# Patient Record
Sex: FEMALE | Race: BLACK OR AFRICAN AMERICAN | NOT HISPANIC OR LATINO | Employment: UNEMPLOYED | URBAN - METROPOLITAN AREA
[De-identification: names, ages, dates, MRNs, and addresses within clinical notes are randomized per-mention and may not be internally consistent; named-entity substitution may affect disease eponyms.]

---

## 2021-12-09 ENCOUNTER — TELEPHONE (OUTPATIENT)
Dept: OBSTETRICS AND GYNECOLOGY | Facility: CLINIC | Age: 26
End: 2021-12-09
Payer: MEDICAID

## 2021-12-16 ENCOUNTER — TELEPHONE (OUTPATIENT)
Dept: OBSTETRICS AND GYNECOLOGY | Facility: CLINIC | Age: 26
End: 2021-12-16
Payer: MEDICAID

## 2021-12-20 ENCOUNTER — TELEPHONE (OUTPATIENT)
Dept: OBSTETRICS AND GYNECOLOGY | Facility: HOSPITAL | Age: 26
End: 2021-12-20
Payer: MEDICAID

## 2021-12-22 ENCOUNTER — TELEPHONE (OUTPATIENT)
Dept: OBSTETRICS AND GYNECOLOGY | Facility: CLINIC | Age: 26
End: 2021-12-22
Payer: MEDICAID

## 2022-01-07 ENCOUNTER — TELEPHONE (OUTPATIENT)
Dept: OBSTETRICS AND GYNECOLOGY | Facility: CLINIC | Age: 27
End: 2022-01-07
Payer: MEDICAID

## 2022-01-07 ENCOUNTER — OFFICE VISIT (OUTPATIENT)
Dept: OBSTETRICS AND GYNECOLOGY | Facility: CLINIC | Age: 27
End: 2022-01-07
Payer: MEDICAID

## 2022-01-07 VITALS
BODY MASS INDEX: 31.4 KG/M2 | SYSTOLIC BLOOD PRESSURE: 112 MMHG | DIASTOLIC BLOOD PRESSURE: 60 MMHG | WEIGHT: 188.5 LBS | HEIGHT: 65 IN

## 2022-01-07 DIAGNOSIS — O34.219 PREVIOUS CESAREAN SECTION COMPLICATING PREGNANCY: ICD-10-CM

## 2022-01-07 DIAGNOSIS — Z87.59 HISTORY OF PLACENTA ABRUPTION: ICD-10-CM

## 2022-01-07 DIAGNOSIS — Z36.89 ENCOUNTER FOR ULTRASOUND TO ASSESS FETAL GROWTH: Primary | ICD-10-CM

## 2022-01-07 PROCEDURE — 99203 PR OFFICE/OUTPT VISIT, NEW, LEVL III, 30-44 MIN: ICD-10-PCS | Mod: TH,S$PBB,, | Performed by: ADVANCED PRACTICE MIDWIFE

## 2022-01-07 PROCEDURE — 99999 PR PBB SHADOW E&M-EST. PATIENT-LVL III: ICD-10-PCS | Mod: PBBFAC,,, | Performed by: ADVANCED PRACTICE MIDWIFE

## 2022-01-07 PROCEDURE — 99213 OFFICE O/P EST LOW 20 MIN: CPT | Mod: PBBFAC,TH | Performed by: ADVANCED PRACTICE MIDWIFE

## 2022-01-07 PROCEDURE — 3078F PR MOST RECENT DIASTOLIC BLOOD PRESSURE < 80 MM HG: ICD-10-PCS | Mod: CPTII,,, | Performed by: ADVANCED PRACTICE MIDWIFE

## 2022-01-07 PROCEDURE — 1159F PR MEDICATION LIST DOCUMENTED IN MEDICAL RECORD: ICD-10-PCS | Mod: CPTII,,, | Performed by: ADVANCED PRACTICE MIDWIFE

## 2022-01-07 PROCEDURE — 3074F SYST BP LT 130 MM HG: CPT | Mod: CPTII,,, | Performed by: ADVANCED PRACTICE MIDWIFE

## 2022-01-07 PROCEDURE — 99203 OFFICE O/P NEW LOW 30 MIN: CPT | Mod: TH,S$PBB,, | Performed by: ADVANCED PRACTICE MIDWIFE

## 2022-01-07 PROCEDURE — 99999 PR PBB SHADOW E&M-EST. PATIENT-LVL III: CPT | Mod: PBBFAC,,, | Performed by: ADVANCED PRACTICE MIDWIFE

## 2022-01-07 PROCEDURE — 1159F MED LIST DOCD IN RCRD: CPT | Mod: CPTII,,, | Performed by: ADVANCED PRACTICE MIDWIFE

## 2022-01-07 PROCEDURE — 3008F BODY MASS INDEX DOCD: CPT | Mod: CPTII,,, | Performed by: ADVANCED PRACTICE MIDWIFE

## 2022-01-07 PROCEDURE — 3078F DIAST BP <80 MM HG: CPT | Mod: CPTII,,, | Performed by: ADVANCED PRACTICE MIDWIFE

## 2022-01-07 PROCEDURE — 3008F PR BODY MASS INDEX (BMI) DOCUMENTED: ICD-10-PCS | Mod: CPTII,,, | Performed by: ADVANCED PRACTICE MIDWIFE

## 2022-01-07 PROCEDURE — 3074F PR MOST RECENT SYSTOLIC BLOOD PRESSURE < 130 MM HG: ICD-10-PCS | Mod: CPTII,,, | Performed by: ADVANCED PRACTICE MIDWIFE

## 2022-01-07 RX ORDER — ASPIRIN 81 MG/1
81 TABLET ORAL DAILY
COMMUNITY

## 2022-01-07 RX ORDER — FERROUS SULFATE TAB 325 MG (65 MG ELEMENTAL FE) 325 (65 FE) MG
TAB ORAL DAILY
COMMUNITY
Start: 2021-12-11

## 2022-01-07 NOTE — PROGRESS NOTES
CHIEF COMPLAINT:   Patient presents with      Possible Pregnancy        HISTORY OF PRESENT ILLNESS  Rocky Sales 26 y.o.  presents for pregnancy risk assessment.   The patient has no complaints today.  No nausea or vomiting. No bleeding or pain.  [facility] She has had a successful vaginal delivery followed by 2 c/s (first for abruption, second was repeat).   She is 35 weeks today.       Past Medical History:   Diagnosis Date    Anemia        Past Surgical History:   Procedure Laterality Date     SECTION      X2       Family History   Problem Relation Age of Onset    Diabetes Father     Breast cancer Neg Hx     Colon cancer Neg Hx     Ovarian cancer Neg Hx     Cancer Neg Hx     Eclampsia Neg Hx     Hypertension Neg Hx     Miscarriages / Stillbirths Neg Hx      labor Neg Hx     Stroke Neg Hx        Social History     Socioeconomic History    Marital status:    Tobacco Use    Smoking status: Never Smoker    Smokeless tobacco: Never Used   Substance and Sexual Activity    Alcohol use: Not Currently    Drug use: Never    Sexual activity: Yes     Partners: Male       Current Outpatient Medications   Medication Sig Dispense Refill    aspirin (ECOTRIN) 81 MG EC tablet Take 81 mg by mouth once daily.      FEROSUL 325 mg (65 mg iron) Tab tablet Take by mouth once daily.      prenatal vit no.124/iron/folic (PRENATAL VITAMIN ORAL) Take by mouth.       No current facility-administered medications for this visit.       Review of patient's allergies indicates:  No Known Allergies      PHYSICAL EXAM   Vitals:    22 1326   BP: 112/60        PAIN SCALE: 0/10 None    PHYSICAL EXAM    ROS:  GENERAL: No fever, chills, fatigability or weight loss.  CV: Denies chest pain  PULM: Denies shortness of breath or wheezing.  ABDOMEN: Appetite fine. No weight loss. Denies diarrhea, abdominal pain, hematemesis or blood in  stool.  URINARY: No flank pain, dysuria or hematuria.  REPRODUCTIVE: No abnormal vaginal bleeding.       PE:   APPEARANCE: Well nourished, well developed, in no acute distress  CHEST: Clear to auscultation bilaterally  CV: Regular rate and rhythm      A/P:     -Discussed  in depth. Will need to see MD for approval. Discussed NO IOL with , and if unfavorable will repeat c/s at 41 weeks.   -Breech on US that was done with MD yesterday. Discussed NO breech vaginal delivery. Will do US nv for growth and position.   Discussed GBS collection nv.   LARS signed for records/op report.

## 2022-01-07 NOTE — TELEPHONE ENCOUNTER
----- Message from Cathy Lezama sent at 1/7/2022  8:16 AM CST -----  Regarding: Appt Scheduled  Patient has an appt scheduled for this afternoon   Patient would like to know if she can be seen at an earlier time today   Please Assist     Patient can be reached at [phone]

## 2022-01-19 ENCOUNTER — PATIENT MESSAGE (OUTPATIENT)
Dept: OBSTETRICS AND GYNECOLOGY | Facility: CLINIC | Age: 27
End: 2022-01-19
Payer: MEDICAID

## 2022-01-20 ENCOUNTER — PROCEDURE VISIT (OUTPATIENT)
Dept: OBSTETRICS AND GYNECOLOGY | Facility: CLINIC | Age: 27
End: 2022-01-20
Payer: MEDICAID

## 2022-01-20 ENCOUNTER — TELEPHONE (OUTPATIENT)
Dept: OBSTETRICS AND GYNECOLOGY | Facility: CLINIC | Age: 27
End: 2022-01-20
Payer: MEDICAID

## 2022-01-20 ENCOUNTER — PATIENT MESSAGE (OUTPATIENT)
Dept: OBSTETRICS AND GYNECOLOGY | Facility: CLINIC | Age: 27
End: 2022-01-20

## 2022-01-20 ENCOUNTER — ROUTINE PRENATAL (OUTPATIENT)
Dept: OBSTETRICS AND GYNECOLOGY | Facility: CLINIC | Age: 27
End: 2022-01-20
Payer: MEDICAID

## 2022-01-20 VITALS
SYSTOLIC BLOOD PRESSURE: 120 MMHG | BODY MASS INDEX: 31.55 KG/M2 | WEIGHT: 189.63 LBS | DIASTOLIC BLOOD PRESSURE: 78 MMHG

## 2022-01-20 DIAGNOSIS — O34.219 PREVIOUS CESAREAN SECTION COMPLICATING PREGNANCY: Primary | ICD-10-CM

## 2022-01-20 DIAGNOSIS — Z36.89 ENCOUNTER FOR ULTRASOUND TO ASSESS FETAL GROWTH: ICD-10-CM

## 2022-01-20 PROCEDURE — 76816 US OB/GYN PROCEDURE (VIEWPOINT): ICD-10-PCS | Mod: 26,S$PBB,, | Performed by: OBSTETRICS & GYNECOLOGY

## 2022-01-20 PROCEDURE — 99212 OFFICE O/P EST SF 10 MIN: CPT | Mod: PBBFAC,TH | Performed by: OBSTETRICS & GYNECOLOGY

## 2022-01-20 PROCEDURE — 99212 PR OFFICE/OUTPT VISIT, EST, LEVL II, 10-19 MIN: ICD-10-PCS | Mod: TH,S$PBB,, | Performed by: OBSTETRICS & GYNECOLOGY

## 2022-01-20 PROCEDURE — 99212 OFFICE O/P EST SF 10 MIN: CPT | Mod: TH,S$PBB,, | Performed by: OBSTETRICS & GYNECOLOGY

## 2022-01-20 PROCEDURE — 99999 PR PBB SHADOW E&M-EST. PATIENT-LVL II: CPT | Mod: PBBFAC,,, | Performed by: OBSTETRICS & GYNECOLOGY

## 2022-01-20 PROCEDURE — 99999 PR PBB SHADOW E&M-EST. PATIENT-LVL II: ICD-10-PCS | Mod: PBBFAC,,, | Performed by: OBSTETRICS & GYNECOLOGY

## 2022-01-20 PROCEDURE — 76819 US OB/GYN PROCEDURE (VIEWPOINT): ICD-10-PCS | Mod: 26,S$PBB,, | Performed by: OBSTETRICS & GYNECOLOGY

## 2022-01-20 PROCEDURE — 76816 OB US FOLLOW-UP PER FETUS: CPT | Mod: PBBFAC | Performed by: OBSTETRICS & GYNECOLOGY

## 2022-01-20 PROCEDURE — 76819 FETAL BIOPHYS PROFIL W/O NST: CPT | Mod: 26,S$PBB,, | Performed by: OBSTETRICS & GYNECOLOGY

## 2022-01-20 NOTE — TELEPHONE ENCOUNTER
----- Message from Cece Iniguez sent at 1/20/2022  2:04 PM CST -----  Regarding: pt call  Name of Who is Calling: LUTHER MARTÍNEZ [[id number]]           What is the request in detail: Would like a call back from midwife, states she has opp and pregnancy report and previous surgery. She would like to know if she can upload pictures to [facility]. pl advise.            Can the clinic reply by [facility]: yes           What Number to Call Back if not in [facility]: [phone]

## 2022-01-20 NOTE — TELEPHONE ENCOUNTER
----- Message from Cece Iniguez sent at 1/20/2022  2:04 PM CST -----  Regarding: pt call  Name of Who is Calling: LUTHER MARTÍNEZ [[id number]]           What is the request in detail: Would like a call back from midwife, states she has opp and pregnancy report and previous surgery. She would like to know if she can upload pictures to [facility]. pl advise.            Can the clinic reply by [facility]: yes           What Number to Call Back if not in [facility]: [phone]

## 2022-01-20 NOTE — PROGRESS NOTES
US today: 2872 g (24%), cephalic, 3VC, anterior placenta, BPP 8/8, NASRIN 12.6, MVP 6.3, Debris noted in stomach  Pt reports no complaints.  Was referred by CNM to discuss delivery options.  Pt with prenatal care with Dr. Acharya in [place].  Pt with history of  x 1 followed by C/S x 2.  Pt desires TOLAC but states that Dr. Morales will not do this.  Pt would like to discuss options with [facility].  To date, states pregnancy has been unremarkable and already had GBS done in [place].  No records in chart.    A/P:  Previous  section complicating pregnancy  -    Pt was counseled on delivery options ( vs Repeat C/S) along with associated risks and benefits.  In particular, risk of uterine rupture with  attempt was reviewed.  Also discussed limited data with TOLAC with C/S x 2, but overall safe to proceed if able to confirm LTCS with both surgeries.  Group policy against elective induction or use of pitocin/cytotec in this scenario was discussed.  Pt voiced understanding and desires to proceed with TOLAC/.  There are no records for me to review today, which limits my ability to approve this.  Thus, pt was advised that I cannot approve TOLAC without being able to review both Op Reports first.  If able to confirm LTCS with both surgeries, then would be able to proceed with TOLAC approval.  -     Request Op reports for both C/S and request all prenatal records from Dr. Acharya's office ASAP.  Pt also advised to contact his office to obtain a physical copy of her prenatal records and Op report to bring to her next visit.  -     Labor precautions and kick counts.    Follow up in about 1 week (around 2022).

## 2022-01-20 NOTE — TELEPHONE ENCOUNTER
Called patient and she stated she just spoke to someone and she is going to upload records into My Chart.

## 2022-01-21 ENCOUNTER — PATIENT MESSAGE (OUTPATIENT)
Dept: OBSTETRICS AND GYNECOLOGY | Facility: CLINIC | Age: 27
End: 2022-01-21
Payer: MEDICAID

## 2022-01-25 ENCOUNTER — TELEPHONE (OUTPATIENT)
Dept: OBSTETRICS AND GYNECOLOGY | Facility: CLINIC | Age: 27
End: 2022-01-25
Payer: MEDICAID

## 2022-01-25 NOTE — TELEPHONE ENCOUNTER
----- Message from Patricia Rowland sent at 1/25/2022 12:49 PM CST -----  Regarding: Need Medical Advice  Type:  Needs Medical Advice    Who Called: Patient states experiencing stomach and back  pain and nausea.   Patient has questions  need to be advised    Symptoms (please be specific):   How long has patient had these symptoms:      Would the patient rather a call back or a response via [facility]? call  Best Call Back Number: [phone]  Additional Information:

## 2022-01-25 NOTE — TELEPHONE ENCOUNTER
Called patient to discuss pain and nausea that she has been having on and off for the past 24 hours. Patient states that it has stopped but she's afraid that she will begin to experience symptoms again and go into labor. Advised patient that if contractions are 3-5 minutes apart lasting 1min then she should go to Labor and Delivery. Patient verbalized understanding.

## 2022-01-31 ENCOUNTER — NURSE TRIAGE (OUTPATIENT)
Dept: ADMINISTRATIVE | Facility: CLINIC | Age: 27
End: 2022-01-31
Payer: MEDICAID

## 2022-02-01 ENCOUNTER — HOSPITAL ENCOUNTER (OUTPATIENT)
Facility: HOSPITAL | Age: 27
Discharge: HOME | End: 2022-02-01
Attending: OBSTETRICS & GYNECOLOGY | Admitting: OBSTETRICS & GYNECOLOGY
Payer: MEDICAID

## 2022-02-01 VITALS
OXYGEN SATURATION: 98 % | DIASTOLIC BLOOD PRESSURE: 67 MMHG | HEART RATE: 108 BPM | RESPIRATION RATE: 18 BRPM | SYSTOLIC BLOOD PRESSURE: 112 MMHG | TEMPERATURE: 99 F

## 2022-02-01 DIAGNOSIS — O47.9 THREATENED LABOR AT TERM: ICD-10-CM

## 2022-02-01 LAB
BILIRUB UR QL STRIP: NEGATIVE
CLARITY UR: CLEAR
COLOR UR: YELLOW
GLUCOSE UR QL STRIP: NEGATIVE
HGB UR QL STRIP: NEGATIVE
KETONES UR QL STRIP: NEGATIVE
LEUKOCYTE ESTERASE UR QL STRIP: ABNORMAL
MICROSCOPIC COMMENT: NORMAL
NITRITE UR QL STRIP: NEGATIVE
PH UR STRIP: 6 [PH] (ref 5–8)
PROT UR QL STRIP: NEGATIVE
SP GR UR STRIP: 1.01 (ref 1–1.03)
URN SPEC COLLECT METH UR: ABNORMAL
UROBILINOGEN UR STRIP-ACNC: NEGATIVE EU/DL
WBC #/AREA URNS HPF: 5 /HPF (ref 0–5)

## 2022-02-01 PROCEDURE — 59025 FETAL NON-STRESS TEST: CPT | Mod: 26,,, | Performed by: MIDWIFE

## 2022-02-01 PROCEDURE — 59025 OBTAIN FETAL NONSTRESS TEST (NST): ICD-10-PCS | Mod: 26,,, | Performed by: MIDWIFE

## 2022-02-01 PROCEDURE — 99213 OFFICE O/P EST LOW 20 MIN: CPT | Mod: TH,25,, | Performed by: MIDWIFE

## 2022-02-01 PROCEDURE — 81000 URINALYSIS NONAUTO W/SCOPE: CPT | Performed by: MIDWIFE

## 2022-02-01 PROCEDURE — 99211 OFF/OP EST MAY X REQ PHY/QHP: CPT | Mod: TH

## 2022-02-01 PROCEDURE — 99213 PR OFFICE/OUTPT VISIT, EST, LEVL III, 20-29 MIN: ICD-10-PCS | Mod: TH,25,, | Performed by: MIDWIFE

## 2022-02-01 PROCEDURE — 59025 FETAL NON-STRESS TEST: CPT

## 2022-02-01 NOTE — H&P
[facility] - Labor & Delivery  Obstetrics  History & Physical    Patient Name: Rocky Sales  MRN: [id number]  Admission Date: 2022  Primary Care Provider: Primary Doctor No    Subjective:     Principal Problem:Threatened labor at term    History of Present Illness:  26 y.o.  JOI 22 EGA 39w1d c/o back pain and feeling like she may be in labor       Obstetric HPI:  Patient reports irregular contractions, active fetal movement, No vaginal bleeding , No loss of fluid     This pregnancy has been complicated by:  Previous c/s x2, hx of IUFD, hx of placental abruption     OB History    Para Term  AB Living   4 3 2 1 0 2   SAB IAB Ectopic Multiple Live Births   0 0 0 0 3      # Outcome Date GA Lbr Jhon/2nd Weight Sex Delivery Anes PTL Lv   4 Current            3 Term     M CS-Unspec   HARRISON   2      M CS-Unspec   ND   1 Term     M Vag-Spont   HARRISON     Past Medical History:   Diagnosis Date    Anemia      Past Surgical History:   Procedure Laterality Date     SECTION      X2       PTA Medications   Medication Sig    aspirin (ECOTRIN) 81 MG EC tablet Take 81 mg by mouth once daily.    FEROSUL 325 mg (65 mg iron) Tab tablet Take by mouth once daily.    prenatal vit no.124/iron/folic (PRENATAL VITAMIN ORAL) Take by mouth.       Review of patient's allergies indicates:  No Known Allergies     Family History     Problem Relation (Age of Onset)    Diabetes Father        Tobacco Use    Smoking status: Never Smoker    Smokeless tobacco: Never Used   Substance and Sexual Activity    Alcohol use: Not Currently    Drug use: Never    Sexual activity: Yes     Partners: Male     Review of Systems   Musculoskeletal: Positive for back pain.      Objective:     Vital Signs (Most Recent):  Temp: 99 °F (37.2 °C) (22)  Pulse: 100 (22)  Resp: 18 (22)  BP: 123/75 (22)  SpO2: 97 % (22) Vital Signs (24h Range):  Temp:  [99 °F (37.2 °C)] 99 °F  (37.2 °C)  Pulse:  [100-103] 100  Resp:  [18] 18  SpO2:  [97 %] 97 %  BP: (123-124)/(68-75) 123/75        There is no height or weight on file to calculate BMI.    FHT: 130 Cat 1 (reassuring)  TOCO:  irritability     Physical Exam:   Constitutional: She is oriented to person, place, and time. She appears well-developed and well-nourished.    HENT:   Head: Normocephalic.    Eyes: Conjunctivae are normal.      Pulmonary/Chest: Effort normal.        Abdominal: Soft.     Genitourinary:    Vagina normal.      Genitourinary Comments: Gravid uterus               Musculoskeletal: Normal range of motion.       Neurological: She is alert and oriented to person, place, and time.    Skin: Skin is warm and dry.    Psychiatric: She has a normal mood and affect. Her behavior is normal. Judgment and thought content normal.       Cervix: per RN  Dilation:  1  Effacement:  thick  Station: high  Presentation:  vertex     Significant Labs:  No results found for: GROUPTRH, HEPBSAG, RUBELLAIGGSC, STREPBCULT, AFP, [id number]    I have personallly reviewed all pertinent lab results from the last 24 hours.    Assessment/Plan:     26 y.o. female  at 39w1d for:    * Threatened labor at term  R/o labor   PO fluids         Moraima Castillo CNM  Obstetrics  [facility] - Labor & Delivery

## 2022-02-01 NOTE — TELEPHONE ENCOUNTER
OOC NT incoming call -  Pt reports back pain and nause all day. 39wks gestation -  Pregnancy labor protocol followed and pt advised to go to L&D now or triage per provider. Pt request call to provider as she lives 1.5 hours away and does not want to drive if not in true labor or dilation. Nt place call to on call provider , DUSTIN Csatillo CNM and notified of above. Instructed to have pt come in for evaluation. Returned call to pt and notified of instructions.  Pt reports she will see if pain goes away with hot shower then decide if she will go in.    Reason for Disposition   [1] Having contractions or other symptoms of labor (such as vaginal pressure) AND [2] 37 or more weeks pregnant (i.e., term pregnancy)   [1] MILD abdominal pain (e.g., doesn't interfere with normal activities) AND [2] constant AND [3] present > 2 hours    Additional Information   Negative: Passed out (i.e., lost consciousness, collapsed and was not responding)   Negative: Shock suspected (e.g., cold/pale/clammy skin, too weak to stand, low BP, rapid pulse)   Negative: Difficult to awaken or acting confused (e.g., disoriented, slurred speech)   Negative: [1] SEVERE abdominal pain (e.g., excruciating) AND [2] constant AND [3] present > 1 hour   Negative: SEVERE vaginal bleeding (e.g., continuous red blood from vagina, large blood clots)   Negative: Sounds like a life-threatening emergency to the triager   Negative: Followed an abdomen (stomach) injury   Negative: Passed out (i.e., lost consciousness, collapsed and was not responding)   Negative: Shock suspected (e.g., cold/pale/clammy skin, too weak to stand, low BP, rapid pulse)   Negative: Difficult to awaken or acting confused (e.g., disoriented, slurred speech)   Negative: [1] SEVERE abdominal pain (e.g., excruciating) AND [2] constant AND [3] present > 1 hour   Negative: Severe bleeding (e.g., continuous red blood from vagina, or large blood clots)   Negative: Umbilical cord hanging  "out of the vagina (shiny, white, curled appearance, "like telephone cord")   Negative: Uncontrollable urge to push (i.e., feels like baby is coming out now)   Negative: Can see baby   Negative: Sounds like a life-threatening emergency to the triager   Negative: Pregnant < 37 weeks (i.e., )   Negative: [1] Uncertain delivery date AND [2] possibly pregnant < 37 weeks (i.e., )   Negative: [1] First baby (primipara) AND [2] contractions < 6 minutes apart  AND [3] present 2 hours   Negative: [1] History of prior delivery (multipara) AND [2] contractions < 10 minutes apart AND [3] present 1 hour   Negative: [1] History of rapid prior delivery AND [2] contractions < 10 minutes apart   Negative: [1] Leakage of fluid from vagina AND [2] green or brown in color   Negative: [1] Leakage of fluid from vagina AND [2] leakage started > 4 hours ago   Negative: Vaginal bleeding or spotting (Exception: brief spotting after intercourse or pelvic exam)   Negative: Baby moving less today (e.g., kick count < 5 in 1 hour or < 10 in 2 hours)   Negative: Severe headache or headache that won't go away   Negative: New blurred vision or vision changes   Negative: MODERATE-SEVERE abdominal pain    Protocols used: PREGNANCY - ABDOMINAL PAIN GREATER THAN 20 WEEKS EGA-A-, PREGNANCY - LABOR-A-      "

## 2022-02-01 NOTE — PROCEDURES
Rocky Sales is a 26 y.o. female patient.    Temp: 99 °F (37.2 °C) (02/01/22 0515)  Pulse: 108 (02/01/22 1000)  Resp: 18 (02/01/22 0515)  BP: 112/67 (02/01/22 1000)  SpO2: 98 % (02/01/22 1000)       Obtain Fetal nonstress test (NST)    Date/Time: 2/1/2022 10:52 AM  Performed by: Emory Queen CNM  Authorized by: Emory Queen CNM     Nonstress Test:     Variability:  6-25 BPM    Decelerations:  None    Accelerations:  15 bpm    Contractions:  Irregular    Contraction Frequency:  5-8  Biophysical Profile:     Nonstress Test Interpretation: reactive      Overall Impression:  Reassuring  Post-procedure:     Patient tolerance:  Patient tolerated the procedure well with no immediate complications        2/1/2022

## 2022-02-01 NOTE — DISCHARGE INSTRUCTIONS

## 2022-02-01 NOTE — NURSING
1040: RN at  providing d/c instructions, strict return precautions. Pt verbalized understanding, provided time to ask questions, all questions answered at this time.     1051: Pt ambulated off unit c all personal belongings, NAD noted.

## 2022-02-01 NOTE — SUBJECTIVE & OBJECTIVE
Obstetric HPI:  Patient reports irregular contractions, active fetal movement, No vaginal bleeding , No loss of fluid     This pregnancy has been complicated by:  Previous c/s x2, hx of IUFD, hx of placental abruption     OB History    Para Term  AB Living   4 3 2 1 0 2   SAB IAB Ectopic Multiple Live Births   0 0 0 0 3      # Outcome Date GA Lbr Jhon/2nd Weight Sex Delivery Anes PTL Lv   4 Current            3 Term     M CS-Unspec   HARRISON   2      M CS-Unspec   ND   1 Term     M Vag-Spont   HARRISON     Past Medical History:   Diagnosis Date    Anemia      Past Surgical History:   Procedure Laterality Date     SECTION      X2       PTA Medications   Medication Sig    aspirin (ECOTRIN) 81 MG EC tablet Take 81 mg by mouth once daily.    FEROSUL 325 mg (65 mg iron) Tab tablet Take by mouth once daily.    prenatal vit no.124/iron/folic (PRENATAL VITAMIN ORAL) Take by mouth.       Review of patient's allergies indicates:  No Known Allergies     Family History     Problem Relation (Age of Onset)    Diabetes Father        Tobacco Use    Smoking status: Never Smoker    Smokeless tobacco: Never Used   Substance and Sexual Activity    Alcohol use: Not Currently    Drug use: Never    Sexual activity: Yes     Partners: Male     Review of Systems   Musculoskeletal: Positive for back pain.      Objective:     Vital Signs (Most Recent):  Temp: 99 °F (37.2 °C) (22)  Pulse: 100 (22)  Resp: 18 (22)  BP: 123/75 (22)  SpO2: 97 % (22) Vital Signs (24h Range):  Temp:  [99 °F (37.2 °C)] 99 °F (37.2 °C)  Pulse:  [100-103] 100  Resp:  [18] 18  SpO2:  [97 %] 97 %  BP: (123-124)/(68-75) 123/75        There is no height or weight on file to calculate BMI.    FHT: 130 Cat 1 (reassuring)  TOCO:  irritability     Physical Exam:   Constitutional: She is oriented to person, place, and time. She appears well-developed and well-nourished.    HENT:   Head:  Normocephalic.    Eyes: Conjunctivae are normal.      Pulmonary/Chest: Effort normal.        Abdominal: Soft.     Genitourinary:    Vagina normal.      Genitourinary Comments: Gravid uterus               Musculoskeletal: Normal range of motion.       Neurological: She is alert and oriented to person, place, and time.    Skin: Skin is warm and dry.    Psychiatric: She has a normal mood and affect. Her behavior is normal. Judgment and thought content normal.       Cervix: per RN  Dilation:  1  Effacement:  thick  Station: high  Presentation:  vertex     Significant Labs:  No results found for: GROUPTRH, HEPBSAG, RUBELLAIGGSC, STREPBCULT, AFP, [id number]    I have personallly reviewed all pertinent lab results from the last 24 hours.

## 2023-08-31 NOTE — DISCHARGE SUMMARY
[facility] - Labor & Delivery  Obstetrics  Discharge Summary      Patient Name: Rocky Sales  MRN: [id number]  Admission Date: 2022  Hospital Length of Stay: 0 days  Discharge Date and Time:  2022 10:52 AM  Attending Physician: Twila Vigil MD   Discharging Provider: Emory Queen CNM   Primary Care Provider: Primary Doctor No    HPI: 26 y.o.  JOI 22 EGA 39w1d c/o back pain and feeling like she may be in labor       FHT: Cat 1 (reassuring)  TOCO:  irregular    * No surgery found *     Hospital Course:   R/o labor   PO fluids   D/C home after 6 hrs obs, ctx's spaced out           Final Active Diagnoses:    Diagnosis Date Noted POA    PRINCIPAL PROBLEM:  Threatened labor at term [O47.9] 2022 Yes      Problems Resolved During this Admission:        Significant Diagnostic Studies: Labs: All labs within the past 24 hours have been reviewed          Immunizations     None          This patient has no babies on file.  Pending Diagnostic Studies:     None          Discharged Condition: stable    Disposition: Home or Self Care    Follow Up:   Follow-up Information     Please follow up.    Why: As needed                     Patient Instructions:      Diet Adult Regular     Activity as tolerated     Medications:  Current Discharge Medication List      CONTINUE these medications which have NOT CHANGED    Details   aspirin (ECOTRIN) 81 MG EC tablet Take 81 mg by mouth once daily.      FEROSUL 325 mg (65 mg iron) Tab tablet Take by mouth once daily.      prenatal vit no.124/iron/folic (PRENATAL VITAMIN ORAL) Take by mouth.             Emory Queen CNM  Obstetrics  [facility] - Labor & Delivery   Protopic Pregnancy And Lactation Text: This medication is Pregnancy Category C. It is unknown if this medication is excreted in breast milk when applied topically.

## 2024-11-29 ENCOUNTER — TELEPHONE (OUTPATIENT)
Dept: OBSTETRICS AND GYNECOLOGY | Facility: CLINIC | Age: 29
End: 2024-11-29
Payer: MEDICAID

## 2024-11-29 NOTE — TELEPHONE ENCOUNTER
Patient called asking to see if a doctor will do a  after having 3 c sections. Ms Mckeon advise her that after 2 c sections a doctor will not do one after 2 c sections.